# Patient Record
Sex: MALE | Race: WHITE | NOT HISPANIC OR LATINO | URBAN - METROPOLITAN AREA
[De-identification: names, ages, dates, MRNs, and addresses within clinical notes are randomized per-mention and may not be internally consistent; named-entity substitution may affect disease eponyms.]

---

## 2025-05-14 ENCOUNTER — TELEPHONE (OUTPATIENT)
Dept: PRIMARY CARE | Facility: CLINIC | Age: 20
End: 2025-05-14

## 2025-05-14 NOTE — TELEPHONE ENCOUNTER
Patient called in this afternoon stating that he would like to come back to the practice.  Patient states that he is in the service and [place] last [place] and that is why he has not been in to see the doctor.  Is it okay to add the patient on the schedule as a new patient?  Patient can be reached at [phone].        Thank You

## 2025-05-28 ENCOUNTER — APPOINTMENT (OUTPATIENT)
Dept: PRIMARY CARE | Facility: CLINIC | Age: 20
End: 2025-05-28
Payer: COMMERCIAL

## 2025-05-28 VITALS
HEIGHT: 74 IN | DIASTOLIC BLOOD PRESSURE: 82 MMHG | SYSTOLIC BLOOD PRESSURE: 132 MMHG | HEART RATE: 77 BPM | RESPIRATION RATE: 20 BRPM | OXYGEN SATURATION: 97 % | WEIGHT: 185 LBS | BODY MASS INDEX: 23.74 KG/M2 | TEMPERATURE: 99.7 F

## 2025-05-28 DIAGNOSIS — Z91.018 TREE NUT ALLERGY: ICD-10-CM

## 2025-05-28 DIAGNOSIS — J45.20 MILD INTERMITTENT ASTHMA WITHOUT COMPLICATION (HHS-HCC): Primary | ICD-10-CM

## 2025-05-28 DIAGNOSIS — Z91.010 H/O PEANUT ALLERGY: ICD-10-CM

## 2025-05-28 PROCEDURE — 99203 OFFICE O/P NEW LOW 30 MIN: CPT | Performed by: FAMILY MEDICINE

## 2025-05-28 PROCEDURE — 3008F BODY MASS INDEX DOCD: CPT | Performed by: FAMILY MEDICINE

## 2025-05-28 ASSESSMENT — ENCOUNTER SYMPTOMS
DEPRESSION: 0
OCCASIONAL FEELINGS OF UNSTEADINESS: 0
LOSS OF SENSATION IN FEET: 0

## 2025-05-28 NOTE — PROGRESS NOTES
"Subjective   Patient ID: Syed Moreland is a 19 y.o. male who presents for Annual Exam (Old/new. Was in the army for 3 years. ) and Forms/questionnaires (Needing paperwork completed for hiring process for [employer]. ).    HPI  Patient comes in today as a returning patient to the practice.  He was last here in 8/7/2020.  He has been away in the  for the last 3 years.  He attended basic training June 30, 2021 and got out of the [employer] 12/13/2024.  He had been stationed at [place] in [place].  During his tour he was at other places around the world and had no problems at all with his asthma or breathing.  He did not take any medicine and is on no medicines currently.    He now is going through the recruitment process for the [employer].  There apparently has come in to question his lungs and also peanut and tree nut allergies.  Patient states that he has had no problems at all with any of these issues over the last 4 years.  He states he has been very careful about his peanut and nut allergies and a simple Benadryl tablet is effective if needed.    Review of Systems   All other systems reviewed and are negative.  Plan  Objective   Vitals:  /82   Pulse 77   Temp 37.6 °C (99.7 °F)   Resp 20   Ht 1.88 m (6' 2\")   Wt 83.9 kg (185 lb)   SpO2 97%   BMI 23.75 kg/m²     Physical Exam  Constitutional:       Appearance: He is well-developed.   HENT:      Head: Normocephalic and atraumatic.      Right Ear: Tympanic membrane, ear canal and external ear normal.      Left Ear: Tympanic membrane, ear canal and external ear normal.      Nose: Nose normal.      Mouth/Throat:      Mouth: Mucous membranes are moist.      Pharynx: Oropharynx is clear.   Eyes:      Extraocular Movements: Extraocular movements intact.      Conjunctiva/sclera: Conjunctivae normal.      Pupils: Pupils are equal, round, and reactive to light.   Cardiovascular:      Rate and Rhythm: Normal rate and regular " "rhythm.      Heart sounds: Normal heart sounds. No murmur heard.  Pulmonary:      Effort: Pulmonary effort is normal.      Breath sounds: Normal breath sounds. No wheezing.   Abdominal:      General: Abdomen is flat. Bowel sounds are normal.      Palpations: Abdomen is soft.   Musculoskeletal:         General: Normal range of motion.   Skin:     General: Skin is warm and dry.   Neurological:      General: No focal deficit present.      Mental Status: He is alert and oriented to person, place, and time. Mental status is at baseline.   Psychiatric:         Mood and Affect: Mood normal.         Behavior: Behavior normal.         Thought Content: Thought content normal.         Judgment: Judgment normal.       CBC -  Recent Labs     03/02/21  1722   WBC 8.4   HGB 16.2*   HCT 48.9      MCV 94       CMP -  Recent Labs     03/02/21  1722      K 4.2      CO2 28*   ANIONGAP 14   BUN 20   CREATININE 0.96     Recent Labs     03/02/21  1722   ALBUMIN 5.1*   ALKPHOS 66   ALT 12   AST 15   BILITOT 0.5       LIPID PANEL -   No results for input(s): \"CHOL\", \"LDLCALC\", \"LDLF\", \"HDL\", \"TRIG\" in the last 50704 hours.    No results for input(s): \"BNP\", \"HGBA1C\" in the last 50948 hours.    No results found for: \"SGFCAWCH88\"    Lab Results   Component Value Date    VITD25 32 03/02/2021        Assessment/Plan   Problem List Items Addressed This Visit       Tree nut allergy    H/O peanut allergy    Mild intermittent asthma without complication ([facility]) - Primary   Forms completed for [employer] and copies made for the chart.     Patient exhibiting no signs of depression and does not need treatment at this time.  Medication list reconciled.  Thank you for visiting today!        Professional services: Some of this note was completed using Dragon voice technology and sometimes the software misinterprets words. This may include unintended errors with respect to translation of words, typographical errors or grammar " errors which may not have been identified prior to finalization of the chart note. Please take this into account when reading the note. Thank you.              Ananth Calderon DO 06/01/25 10:37 AM

## 2025-06-01 PROBLEM — J45.20 MILD INTERMITTENT ASTHMA WITHOUT COMPLICATION (HHS-HCC): Status: ACTIVE | Noted: 2025-06-01

## 2025-06-01 PROBLEM — Z91.010 H/O PEANUT ALLERGY: Status: ACTIVE | Noted: 2025-06-01

## 2025-06-01 PROBLEM — Z91.018 TREE NUT ALLERGY: Status: ACTIVE | Noted: 2025-06-01
